# Patient Record
(demographics unavailable — no encounter records)

---

## 2024-11-13 NOTE — HISTORY OF PRESENT ILLNESS
[Fruit] : fruit [Vegetables] : vegetables [Meat] : meat [Dairy] : dairy [Eats healthy meals and snacks] : eats healthy meals and snacks [Eats meals with family] : eats meals with family [Normal] : Normal [Sleeps ___ hours per night] : sleeps [unfilled] hours per night [Brushing teeth twice/d] : brushing teeth twice per day [Yes] : Patient goes to dentist yearly [Toothpaste] : Primary Fluoride Source: Toothpaste [Has Friends] : has friends [Grade ___] : Grade [unfilled] [Adequate social interactions] : adequate social interactions [Adequate behavior] : adequate behavior [Adequate performance] : adequate performance [No] : No cigarette smoke exposure [Exposure to electronic nicotine delivery system] : No exposure to electronic nicotine delivery system [Appropriately restrained in motor vehicle] : appropriately restrained in motor vehicle [Wears helmet and pads] : does not wear helmet and pads [Monitored computer use] : monitored computer use [Up to date] : Up to date [de-identified] : step-mother [FreeTextEntry9] : no regular exercise; plays with his brothers [de-identified] : no parent or teacher concerns

## 2024-11-13 NOTE — PHYSICAL EXAM
[Alert] : alert [No Acute Distress] : no acute distress [Cooperative] : cooperative [Normocephalic] : normocephalic [Conjunctivae with no discharge] : conjunctivae with no discharge [PERRL] : PERRL [EOMI Bilateral] : EOMI bilateral [Auricles Well Formed] : auricles well formed [Clear Tympanic membranes with present light reflex and bony landmarks] : clear tympanic membranes with present light reflex and bony landmarks [No Discharge] : no discharge [Pink Nasal Mucosa] : pink nasal mucosa [Uvula Midline] : uvula midline [Nonerythematous Oropharynx] : nonerythematous oropharynx [Supple, full passive range of motion] : supple, full passive range of motion [No Palpable Masses] : no palpable masses [Clear to Auscultation Bilaterally] : clear to auscultation bilaterally [Regular Rate and Rhythm] : regular rate and rhythm [Normal S1, S2 present] : normal S1, S2 present [No Murmurs] : no murmurs [Soft] : soft [NonTender] : non tender [Non Distended] : non distended [Normoactive Bowel Sounds] : normoactive bowel sounds [Drew: _____] : Drew [unfilled] [Uncircumcised] : uncircumcised [Testicles Descended Bilaterally] : testicles descended bilaterally [No pain or deformities with palpation of bone, muscles, joints] : no pain or deformities with palpation of bone, muscles, joints [Normal Muscle Tone] : normal muscle tone [Straight] : straight [No Scoliosis] : no scoliosis [No Rash or Lesions] : no rash or lesions [FreeTextEntry4] : mild hyperemia of L nasal mucosa, no active bleeding [de-identified] : multiple dental caries [de-identified] : grossly normal

## 2024-11-13 NOTE — HISTORY OF PRESENT ILLNESS
[Fruit] : fruit [Vegetables] : vegetables [Meat] : meat [Dairy] : dairy [Eats healthy meals and snacks] : eats healthy meals and snacks [Eats meals with family] : eats meals with family [Normal] : Normal [Sleeps ___ hours per night] : sleeps [unfilled] hours per night [Brushing teeth twice/d] : brushing teeth twice per day [Yes] : Patient goes to dentist yearly [Toothpaste] : Primary Fluoride Source: Toothpaste [Has Friends] : has friends [Grade ___] : Grade [unfilled] [Adequate social interactions] : adequate social interactions [Adequate behavior] : adequate behavior [Adequate performance] : adequate performance [No] : No cigarette smoke exposure [Exposure to electronic nicotine delivery system] : No exposure to electronic nicotine delivery system [Appropriately restrained in motor vehicle] : appropriately restrained in motor vehicle [Wears helmet and pads] : does not wear helmet and pads [Monitored computer use] : monitored computer use [Up to date] : Up to date [de-identified] : step-mother [FreeTextEntry9] : no regular exercise; plays with his brothers [de-identified] : no parent or teacher concerns

## 2024-11-13 NOTE — PHYSICAL EXAM
[Alert] : alert [No Acute Distress] : no acute distress [Cooperative] : cooperative [Normocephalic] : normocephalic [Conjunctivae with no discharge] : conjunctivae with no discharge [PERRL] : PERRL [EOMI Bilateral] : EOMI bilateral [Auricles Well Formed] : auricles well formed [Clear Tympanic membranes with present light reflex and bony landmarks] : clear tympanic membranes with present light reflex and bony landmarks [No Discharge] : no discharge [Pink Nasal Mucosa] : pink nasal mucosa [Uvula Midline] : uvula midline [Nonerythematous Oropharynx] : nonerythematous oropharynx [Supple, full passive range of motion] : supple, full passive range of motion [No Palpable Masses] : no palpable masses [Clear to Auscultation Bilaterally] : clear to auscultation bilaterally [Regular Rate and Rhythm] : regular rate and rhythm [Normal S1, S2 present] : normal S1, S2 present [No Murmurs] : no murmurs [Soft] : soft [NonTender] : non tender [Non Distended] : non distended [Normoactive Bowel Sounds] : normoactive bowel sounds [Drew: _____] : Drew [unfilled] [Uncircumcised] : uncircumcised [Testicles Descended Bilaterally] : testicles descended bilaterally [No pain or deformities with palpation of bone, muscles, joints] : no pain or deformities with palpation of bone, muscles, joints [Normal Muscle Tone] : normal muscle tone [Straight] : straight [No Scoliosis] : no scoliosis [No Rash or Lesions] : no rash or lesions [FreeTextEntry4] : mild hyperemia of L nasal mucosa, no active bleeding [de-identified] : multiple dental caries [de-identified] : grossly normal

## 2025-07-24 NOTE — DISCUSSION/SUMMARY
[Not Cleared for Procedure] : not cleared for procedure [Cleared for Procedure] : cleared for procedure

## 2025-07-24 NOTE — PHYSICAL EXAM
[General Appearance - Well Developed] : interactive [General Appearance - Well-Appearing] : well appearing [General Appearance - In No Acute Distress] : in no acute distress [Sclera] : the conjunctiva were normal [Outer Ear] : the ears and nose were normal in appearance [Examination Of The Oral Cavity] : mucous membranes were moist and pink [Normal Appearance] : was normal in appearance [Neck Supple] : was supple [Respiration, Rhythm And Depth] : normal respiratory rhythm and effort [Auscultation Breath Sounds / Voice Sounds] : clear bilateral breath sounds [Heart Rate And Rhythm] : heart rate and rhythm were normal [Heart Sounds] : normal S1 and S2 [Murmurs] : no murmurs [Bowel Sounds] : normal bowel sounds [Abdomen Soft] : soft [Abdomen Tenderness] : non-tender [Abdominal Distention] : nondistended [] : no hepato-splenomegaly [FreeTextEntry1] : dental caries noted [Enlarged Diffusely] : was not enlarged

## 2025-07-24 NOTE — HISTORY OF PRESENT ILLNESS
[Preoperative Visit] : for a medical evaluation prior to surgery [Good] : Good [Fever] : no fever [Chills] : no chills [Runny Nose] : no runny nose [Earache] : no earache [Headache] : no headache [Sore Throat] : no sore throat [Cough] : no cough [Appetite] : no decrease in appetite [Nausea] : no nausea [Vomiting] : no vomiting [Abdominal Pain] : no abdominal pain [Diarrhea] : no diarrhea [Rash] : no rash [Prior Anesthesia] : No prior anesthesia [FreeTextEntry2] : 7/25/2025 [FreeTextEntry1] : Preop - h/o dental caries feeling well. no meds no allergies